# Patient Record
Sex: MALE | Race: WHITE | NOT HISPANIC OR LATINO
[De-identification: names, ages, dates, MRNs, and addresses within clinical notes are randomized per-mention and may not be internally consistent; named-entity substitution may affect disease eponyms.]

---

## 2022-09-14 ENCOUNTER — APPOINTMENT (OUTPATIENT)
Dept: OTOLARYNGOLOGY | Facility: CLINIC | Age: 67
End: 2022-09-14

## 2022-09-14 VITALS — TEMPERATURE: 97.3 F | WEIGHT: 135 LBS | HEIGHT: 71 IN | BODY MASS INDEX: 18.9 KG/M2

## 2022-09-14 DIAGNOSIS — H90.3 SENSORINEURAL HEARING LOSS, BILATERAL: ICD-10-CM

## 2022-09-14 DIAGNOSIS — H93.13 TINNITUS, BILATERAL: ICD-10-CM

## 2022-09-14 DIAGNOSIS — Z78.9 OTHER SPECIFIED HEALTH STATUS: ICD-10-CM

## 2022-09-14 DIAGNOSIS — Z92.89 PERSONAL HISTORY OF OTHER MEDICAL TREATMENT: ICD-10-CM

## 2022-09-14 DIAGNOSIS — Z83.3 FAMILY HISTORY OF DIABETES MELLITUS: ICD-10-CM

## 2022-09-14 DIAGNOSIS — Z80.9 FAMILY HISTORY OF MALIGNANT NEOPLASM, UNSPECIFIED: ICD-10-CM

## 2022-09-14 DIAGNOSIS — C80.1 MALIGNANT (PRIMARY) NEOPLASM, UNSPECIFIED: ICD-10-CM

## 2022-09-14 PROBLEM — Z00.00 ENCOUNTER FOR PREVENTIVE HEALTH EXAMINATION: Status: ACTIVE | Noted: 2022-09-14

## 2022-09-14 PROCEDURE — 99203 OFFICE O/P NEW LOW 30 MIN: CPT

## 2022-09-15 PROBLEM — H93.13 TINNITUS, BILATERAL: Status: ACTIVE | Noted: 2022-09-15

## 2022-09-15 PROBLEM — H90.3 SENSORINEURAL HEARING LOSS (SNHL) OF BOTH EARS: Status: ACTIVE | Noted: 2022-09-15

## 2022-09-15 NOTE — HISTORY OF PRESENT ILLNESS
[de-identified] : ANJEL DELANEY is a 66 year old patient referred by Dr. Fraire for ear evaluation.  He said that on Labor Day weekend, [place] the [place].  He did not hit his head or lose consciousness.  On September 10, he developed tinnitus which appears bilateral.  He describes it as a rumbling like noise.  He does not have high-pitched tinnitus or pulsatile tinnitus.  He feels like he has to pop his ears like he has eustachian tube dysfunction.  It has improved but it is still causing him a lot of symptoms and interfering with his sleep.  He does sleep with a air conditioner on at night but still has trouble.  Yesterday, he also developed a mechanical like sound in the ears.  He does have a history of hyperacusis.  He has no otalgia, otorrhea, or vertigo.  He has no history of recurrent middle ear infections, prior otologic surgery, ear trauma, or excessive noise exposure.  He has not had a recent hearing test.

## 2022-09-15 NOTE — REVIEW OF SYSTEMS
[Ear Noises] : ear noises [Hoarseness] : hoarseness [Patient Intake Form Reviewed] : Patient intake form was reviewed

## 2022-09-15 NOTE — ASSESSMENT
[FreeTextEntry1] : He developed bilateral tinnitus which she describes as a rumbling like noise on September 10.  He fell about 5 days prior to that but did not hit his head or lose consciousness.  It is unclear if the rumbling noise is related to his fall.  His ears were normal on exam other than scant cerumen.  Audiogram showed a mild bilateral sensorineural hearing loss.  He also had normal tympanograms.\par \par PLAN\par \par - findings and management options discussed with the patient.  Etiology of his symptoms is unclear\par - Good aural hygiene.\par - noise precautions\par -Monitor hearing\par - literature regarding tinnitus given\par - Avoid substances that can make tinnitus worse.   \par - They may use a white noise maker,  leave the tv/radio, or have other background noise on on when it is quiet \par -He may consider tinnitus therapy.\par -If his symptoms or not improving, we will discuss further work-up such as imaging studies and neurology evaluation.\par -We will see if his symptoms improve over the next couple of weeks.  I will see him back in 2 to 4 weeks if his symptoms do not completely resolve\par - call and return earlier if any concerns

## 2022-09-15 NOTE — CONSULT LETTER
[Dear  ___] : Dear  [unfilled], [Consult Letter:] : I had the pleasure of evaluating your patient, [unfilled]. [Please see my note below.] : Please see my note below. [Consult Closing:] : Thank you very much for allowing me to participate in the care of this patient.  If you have any questions, please do not hesitate to contact me. [Sincerely,] : Sincerely, [FreeTextEntry3] : Annel Aden MD\par